# Patient Record
Sex: FEMALE | Race: WHITE | URBAN - METROPOLITAN AREA
[De-identification: names, ages, dates, MRNs, and addresses within clinical notes are randomized per-mention and may not be internally consistent; named-entity substitution may affect disease eponyms.]

---

## 2022-08-26 ENCOUNTER — HOSPITAL ENCOUNTER (EMERGENCY)
Age: 57
Discharge: HOME | End: 2022-08-26
Attending: EMERGENCY MEDICINE
Payer: MEDICAID

## 2022-08-26 ENCOUNTER — APPOINTMENT (OUTPATIENT)
Dept: CT IMAGING | Age: 57
End: 2022-08-26
Payer: MEDICAID

## 2022-08-26 VITALS
TEMPERATURE: 98.5 F | RESPIRATION RATE: 18 BRPM | DIASTOLIC BLOOD PRESSURE: 113 MMHG | SYSTOLIC BLOOD PRESSURE: 184 MMHG | OXYGEN SATURATION: 99 % | HEART RATE: 77 BPM

## 2022-08-26 DIAGNOSIS — S01.81XA FACIAL LACERATION, INITIAL ENCOUNTER: ICD-10-CM
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
## 2022-08-26 DIAGNOSIS — S02.672A: ICD-10-CM

## 2022-08-26 PROCEDURE — 6370000000 HC RX 637 (ALT 250 FOR IP): Performed by: NURSE PRACTITIONER

## 2022-08-26 PROCEDURE — 6360000002 HC RX W HCPCS: Performed by: NURSE PRACTITIONER

## 2022-08-26 PROCEDURE — 72125 CT NECK SPINE W/O DYE: CPT

## 2022-08-26 PROCEDURE — 70486 CT MAXILLOFACIAL W/O DYE: CPT

## 2022-08-26 PROCEDURE — 12011 RPR F/E/E/N/L/M 2.5 CM/<: CPT

## 2022-08-26 PROCEDURE — 90471 IMMUNIZATION ADMIN: CPT | Performed by: NURSE PRACTITIONER

## 2022-08-26 PROCEDURE — 70450 CT HEAD/BRAIN W/O DYE: CPT

## 2022-08-26 PROCEDURE — 90714 TD VACC NO PRESV 7 YRS+ IM: CPT | Performed by: NURSE PRACTITIONER

## 2022-08-26 PROCEDURE — 99284 EMERGENCY DEPT VISIT MOD MDM: CPT

## 2022-08-26 RX ORDER — TETANUS AND DIPHTHERIA TOXOIDS ADSORBED 2; 2 [LF]/.5ML; [LF]/.5ML
0.5 INJECTION INTRAMUSCULAR ONCE
Status: COMPLETED | OUTPATIENT
Start: 2022-08-26 | End: 2022-08-26

## 2022-08-26 RX ORDER — ATORVASTATIN CALCIUM 10 MG/1
25 TABLET, FILM COATED ORAL DAILY
COMMUNITY

## 2022-08-26 RX ORDER — HYDROCODONE BITARTRATE AND ACETAMINOPHEN 5; 325 MG/1; MG/1
1 TABLET ORAL EVERY 6 HOURS PRN
Qty: 12 TABLET | Refills: 0 | Status: SHIPPED | OUTPATIENT
Start: 2022-08-26 | End: 2022-08-29

## 2022-08-26 RX ORDER — DIAPER,BRIEF,INFANT-TODD,DISP
EACH MISCELLANEOUS ONCE
Status: COMPLETED | OUTPATIENT
Start: 2022-08-26 | End: 2022-08-26

## 2022-08-26 RX ORDER — LIDOCAINE HYDROCHLORIDE AND EPINEPHRINE 10; 10 MG/ML; UG/ML
20 INJECTION, SOLUTION INFILTRATION; PERINEURAL ONCE
Status: DISCONTINUED | OUTPATIENT
Start: 2022-08-26 | End: 2022-08-26 | Stop reason: HOSPADM

## 2022-08-26 RX ORDER — VENLAFAXINE HYDROCHLORIDE 75 MG/1
75 CAPSULE, EXTENDED RELEASE ORAL DAILY
COMMUNITY

## 2022-08-26 RX ADMIN — TETANUS AND DIPHTHERIA TOXOIDS ADSORBED 0.5 ML: 2; 2 INJECTION INTRAMUSCULAR at 11:24

## 2022-08-26 RX ADMIN — BACITRACIN ZINC: 500 OINTMENT TOPICAL at 14:40

## 2022-08-26 ASSESSMENT — PAIN - FUNCTIONAL ASSESSMENT
PAIN_FUNCTIONAL_ASSESSMENT: NONE - DENIES PAIN
PAIN_FUNCTIONAL_ASSESSMENT: 0-10

## 2022-08-26 ASSESSMENT — LIFESTYLE VARIABLES
HOW MANY STANDARD DRINKS CONTAINING ALCOHOL DO YOU HAVE ON A TYPICAL DAY: 1 OR 2
HOW OFTEN DO YOU HAVE A DRINK CONTAINING ALCOHOL: MONTHLY OR LESS

## 2022-08-26 ASSESSMENT — PAIN DESCRIPTION - LOCATION: LOCATION: JAW;FACE

## 2022-08-26 ASSESSMENT — PAIN SCALES - GENERAL: PAINLEVEL_OUTOF10: 8

## 2022-08-26 NOTE — ED PROVIDER NOTES
ED Attending shared visit  CC: No  HPI:  8/26/22,   Time: 2:18 PM EDT         Deepa Fontanez is a 62 y.o. female presenting to the ED for fall with chin laceration, beginning prior to arrival   ago. The complaint has been persistent, mild in severity, and worsened by nothing. Patient presents from the oncology clinic where she was walking down the sidewalk and she had a fall on uneven pavement did not feel lightheaded or dizzy prior to the fall. She struck her chin has a laceration approximate 2.5 centimeters in length to the chin. She denies any loss of conscious. Is on no anticoagulation. ROS:   Pertinent positives and negatives are stated within HPI, all other systems reviewed and are negative.  --------------------------------------------- PAST HISTORY ---------------------------------------------  Past Medical History:  has no past medical history on file. Past Surgical History:  has no past surgical history on file. Social History:      Family History: family history is not on file. The patients home medications have been reviewed. Allergies: Patient has no known allergies. -------------------------------------------------- RESULTS -------------------------------------------------  All laboratory and radiology results have been personally reviewed by myself   LABS:  No results found for this visit on 08/26/22. RADIOLOGY:  Interpreted by Radiologist.  [address]   Final Result   No acute intracranial abnormality. Specifically, there is no acute   intracranial hemorrhage. CT CERVICAL SPINE WO CONTRAST   Final Result   1. There is no acute compression fracture or subluxation of the cervical   spine. 2. Mild multilevel degenerative disc and degenerative joint disease most   prominent at the C5-6 level. .         CT FACIAL BONES WO CONTRAST   Final Result   1.  Nondisplaced, vertically orientated fracture involving anterior   parasymphyseal region of the mandible with fracture extent into anterior   alveolar ridge. 2. Nondisplaced fractures involving left mandibular 3rd molar and left   maxillary 2nd molar. ------------------------- NURSING NOTES AND VITALS REVIEWED ---------------------------   The nursing notes within the ED encounter and vital signs as below have been reviewed. BP (!) 188/118   Pulse 80   Temp 98.5 °F (36.9 °C)   Resp 18   LMP 06/25/2022   SpO2 96%   Oxygen Saturation Interpretation: Normal      ---------------------------------------------------PHYSICAL EXAM--------------------------------------      Constitutional/General: Alert and oriented x3, well appearing, non toxic in NAD  Head: NC/AT, atraumatic  Eyes: PERRL, EOMI  Mouth: Oropharynx clear, handling secretions, no trismus, laceration to the chin approximately 2 and half centimeters in length. She does have discomfort at approximately tooth number 18 and 19 there is malocclusion noted on exam no intraoral hematoma noted  Neck: Supple, full ROM, no meningeal signs  Pulmonary: Lungs clear to auscultation bilaterally, no wheezes, rales, or rhonchi. Not in respiratory distress  Cardiovascular:  Regular rate and rhythm, no murmurs, gallops, or rubs. 2+ distal pulses  Abdomen: Soft, non tender, non distended,   Extremities: Moves all extremities x 4. Warm and well perfused  Skin: warm and dry without rash  Neurologic: GCS 15,  Psych: Normal Affect      LACERATION REPAIR  PROCEDURE NOTE:  Unless otherwise indicated, this procedure was done or directly supervised by the ED attending. Performed By: Blue Hogue CNP. Laceration #: 1. Location: chin  Length: 2.5 cm. The wound area was irrigated with sterile water and draped in a sterile fashion. The wound area was anesthetized with Lidocaine 1% with epinephrine. WOUND COMPLEXITY:    Debridement: None. Undermining: None. Wound Margins Revised: None. Flaps Aligned: yes.   The wound was explored with the following results no foreign body or tendon injury seen. The wound was closed with 5-0 fast gut. Dressing:  bacitracin and a sterile dressing was placed. Total number suture: 6    ------------------------------ ED COURSE/MEDICAL DECISION MAKING----------------------  Medications   lidocaine-EPINEPHrine 1 %-1:[id number] injection 20 mL (has no administration in time range)   bacitracin zinc ointment (has no administration in time range)   diptheria-tetanus toxoids (DECAVAC) 2-2 LF/0.5ML injection 0.5 mL (0.5 mLs IntraMUSCular Given 22 1124)         Medical Decision Makin-patient was examined by Dr. Shea Choi call was placed to plastic surgery return call from joan Taylor, advised to have the patient come to the office on Wednesday for follow-up patient was informed of the abnormal findings with the mandible fracture. Pain medication provided. Discussed soft diet and plan for follow-up on Wednesday. She is from out of town but states she will be in the area for 1 more week. They will remove the sutures on her appointment on Wednesday. Pain medication provided. Counseling: The emergency provider has spoken with the patient and discussed todays results, in addition to providing specific details for the plan of care and counseling regarding the diagnosis and prognosis. Questions are answered at this time and they are agreeable with the plan.      --------------------------------- IMPRESSION AND DISPOSITION ---------------------------------    IMPRESSION  1. Fall, initial encounter    2. Facial laceration, initial encounter    3.  Closed fracture of left side of mandibular alveolar process, initial encounter [facility])        DISPOSITION  Disposition: Discharge to home  Patient condition is good                 YONI David - NAZARIO  [phone]

## 2022-08-31 ENCOUNTER — OFFICE VISIT (OUTPATIENT)
Dept: SURGERY | Age: 57
End: 2022-08-31
Payer: COMMERCIAL

## 2022-08-31 VITALS
OXYGEN SATURATION: 98 % | HEIGHT: 61 IN | HEART RATE: 93 BPM | SYSTOLIC BLOOD PRESSURE: 138 MMHG | TEMPERATURE: 98.6 F | DIASTOLIC BLOOD PRESSURE: 86 MMHG | RESPIRATION RATE: 20 BRPM | BODY MASS INDEX: 28.42 KG/M2 | WEIGHT: 150.5 LBS

## 2022-08-31 DIAGNOSIS — S02.66XB OPEN FRACTURE OF SYMPHYSIS OF BODY OF MANDIBLE, INITIAL ENCOUNTER (HCC): Primary | ICD-10-CM

## 2022-08-31 PROCEDURE — 99202 OFFICE O/P NEW SF 15 MIN: CPT | Performed by: PLASTIC SURGERY

## 2022-08-31 NOTE — PROGRESS NOTES
Department of Plastic Surgery - Adult  Attending Facial Trauma Consult Note      CHIEF COMPLAINT: Facial trauma    History Obtained From:  patient    HISTORY OF PRESENT ILLNESS:                The patient is a 62 y.o. female who presents with complaints of facial trauma after falling on a sidewalk. The patient fell on the sidewalk 5 days prior. The patient states she was carrying equipment for work when she tripped on an uneven part of the sidewalk. She states that she hit her chin on the concrete where she had a laceration to her chin and damage to her jaw and teeth. She states she has contacted her dentist who is out of state. She states that she is planned to see her dentist next week. She states her pain is mild to moderate in her jaw at this time and believes her teeth are still coming together in their normal occlusion but states they still feel off. The patient denies any  blurred vision, double vision or changes in vision. Past Medical History:    No past medical history on file. Past Surgical History:    No past surgical history on file. Current Medications:      Current Outpatient Medications   Medication Sig Dispense Refill    venlafaxine (EFFEXOR XR) 75 MG extended release capsule Take 75 mg by mouth daily      atorvastatin (LIPITOR) 10 MG tablet Take 25 mg by mouth daily       No current facility-administered medications for this visit. Allergies:  Patient has no known allergies.     Social History:   Social History     Socioeconomic History    Marital status: Unknown     Spouse name: Not on file    Number of children: Not on file    Years of education: Not on file    Highest education level: Not on file   Occupational History    Not on file   Tobacco Use    Smoking status: Never    Smokeless tobacco: Never   Vaping Use    Vaping Use: Never used   Substance and Sexual Activity    Alcohol use: Yes     Comment: rarely    Drug use: Never    Sexual activity: Not on file   Other Topics Concern    Not on file   Social History Narrative    Not on file     Social Determinants of Health     Financial Resource Strain: Not on file   Food Insecurity: Not on file   Transportation Needs: Not on file   Physical Activity: Not on file   Stress: Not on file   Social Connections: Not on file   Intimate Partner Violence: Not on file   Housing Stability: Not on file     Family History:   No family history on file. REVIEW OF SYSTEMS:    CONSTITUTIONAL:  negative for  fevers, chills, sweats and fatigue  EYES: negative for dipolpia or acute vision loss. RESPIRATORY:  negative for  dry cough, cough with sputum, dyspnea, wheezing and chest pain  CARDIOVASCULAR:  negative for  chest pain, dyspnea, palpitations, syncope  GASTROINTESTINAL:  negative for nausea, vomiting, change in bowel habits, diarrhea, constipation and abdominal pain  EXTREMITIES: negative for edema  MUSCULOSKELETAL: negative for muscle weakness  SKIN: negative for itching or rashes. BEHAVIOR/PSYCH:  negative for poor appetite, increased appetite, decreased sleep and poor concentration  NEURO: negative for agitated mood    I have reviewed the chief complaint and history of present illness including (ROS and PFSH) and vital documentation by my staff and I agree with their documentation and have added when applicable. PHYSICAL EXAM:      Physical Exam   Constitutional: She is oriented to person, place, and time. She appears well-developed and well-nourished. Neck: Normal range of motion. Neck supple. Cardiovascular: Normal rate, regular rhythm, normal heart sounds and intact distal pulses. Pulmonary/Chest: Effort normal and breath sounds normal.   Abdominal: Soft. Bowel sounds are normal. She exhibits no mass. No tenderness. Musculoskeletal: Normal range of motion. She exhibits no edema. Lymphadenopathy: She has no cervical adenopathy. Neurological: She is alert and oriented to person, place, and time. She has normal reflexes. Skin: Warm and dry. FACE  Head: Normocephalic, there is a 1.5 cm chin laceration just inferior to the submental crease no signs of infection clean dry and intact dissolvable sutures are noted  Right Ear: External ear normal.   Left Ear: External ear normal.   Nose: Nose normal. no Septal Hematoma. no Deviation   Mouth/Throat: Oropharynx is clear and moist.  Class I occlusion excursion greater than 40 mm  Eyes:  Conjunctivae and extraocular motions are normal. Pupils are equal, round, and reactive to light. No Stepoffs palpated      DATA:    Radiology Review:    EXAMINATION:   CT OF THE FACE WITHOUT CONTRAST  8/26/2022 11:57 am       TECHNIQUE:   CT of the face was performed without the administration of intravenous   contrast. Multiplanar reformatted images are provided for review. Automated   exposure control, iterative reconstruction, and/or weight based adjustment of   the mA/kV was utilized to reduce the radiation dose to as low as reasonably   achievable. COMPARISON:   None       HISTORY:   ORDERING SYSTEM PROVIDED HISTORY: fall, head injury   TECHNOLOGIST PROVIDED HISTORY:   Reason for exam:->fall, head injury   Decision Support Exception - unselect if not a suspected or confirmed   emergency medical condition->Emergency Medical Condition (MA)   What reading provider will be dictating this exam?->CRC       FINDINGS:   There is a nondisplaced, vertically orientated fracture involving anterior   parasymphyseal region of the mandible with fracture extent into anterior   alveolar ridge. Subtle ring of hypoattenuation around root of tooth #24   could suggest subtle loosening. Minimally displaced vertically orientated   fracture involving left mandibular 3rd molar. Nondisplaced fracture   involving left 2nd maxillary molar. No dislocation at level of the   temporomandibular joints. Globes and orbits are intact. No air-fluid levels   in paranasal sinuses. Mastoid air cells are clear. Impression   1. Nondisplaced, vertically orientated fracture involving anterior   parasymphyseal region of the mandible with fracture extent into anterior   alveolar ridge. 2. Nondisplaced fractures involving left mandibular 3rd molar and left   maxillary 2nd molar. IMPRESSION/RECOMMENDATIONS:      Diagnosis  -) Submental crease laceration  -) Nondisplaced parasymphyseal mandible fracture      Keflex for 5 days  HOB Elevated 30 degrees  No Nose Blowing  Soft Diet  Recommended patient contact their optometrist / opthalmologist for a routine eye exam.    Surgical Plan: none    I did give the patient the option for closed reduction to most adequately treat a vertically oriented symphyseal fracture. However, however this is completely nondisplaced and the patient stated she would definitely adhere to a nonchew and soft diet. Patient will be leaving for [place] this weekend and will be seeing her dentist to evaluate the status of her her mandibular incisors which are somewhat rotated. I did inform her that she may seek out my care at any time for additional needs as deemed by her dentist.  Tor Lazo she may follow-up with a plastic or oral surgeon in her area. The patient voiced appreciation and is in agreement with this conservative plan. This document is generated, in part, by voice recognition software and thus  syntax and grammatical errors are possible.     May Granados MD  2:43 PM  9/6/2022